# Patient Record
Sex: FEMALE | NOT HISPANIC OR LATINO
[De-identification: names, ages, dates, MRNs, and addresses within clinical notes are randomized per-mention and may not be internally consistent; named-entity substitution may affect disease eponyms.]

---

## 2023-03-27 ENCOUNTER — APPOINTMENT (OUTPATIENT)
Dept: ORTHOPEDIC SURGERY | Facility: CLINIC | Age: 43
End: 2023-03-27
Payer: OTHER MISCELLANEOUS

## 2023-03-27 DIAGNOSIS — M25.521 PAIN IN RIGHT ELBOW: ICD-10-CM

## 2023-03-27 DIAGNOSIS — G56.20 LESION OF ULNAR NERVE, UNSPECIFIED UPPER LIMB: ICD-10-CM

## 2023-03-27 DIAGNOSIS — M25.522 PAIN IN RIGHT ELBOW: ICD-10-CM

## 2023-03-27 DIAGNOSIS — M25.519 PAIN IN UNSPECIFIED SHOULDER: ICD-10-CM

## 2023-03-27 PROCEDURE — 73010 X-RAY EXAM OF SHOULDER BLADE: CPT | Mod: LT

## 2023-03-27 PROCEDURE — 95864 NEEDLE EMG 4 EXTREMITIES: CPT

## 2023-03-27 PROCEDURE — 73070 X-RAY EXAM OF ELBOW: CPT | Mod: 50

## 2023-03-27 PROCEDURE — 73030 X-RAY EXAM OF SHOULDER: CPT | Mod: LT

## 2023-03-27 PROCEDURE — 99203 OFFICE O/P NEW LOW 30 MIN: CPT

## 2023-03-28 ENCOUNTER — FORM ENCOUNTER (OUTPATIENT)
Age: 43
End: 2023-03-28

## 2023-03-28 ENCOUNTER — APPOINTMENT (OUTPATIENT)
Dept: PAIN MANAGEMENT | Facility: CLINIC | Age: 43
End: 2023-03-28
Payer: OTHER MISCELLANEOUS

## 2023-03-28 VITALS — WEIGHT: 177 LBS | BODY MASS INDEX: 31.36 KG/M2 | HEIGHT: 63 IN

## 2023-03-28 DIAGNOSIS — S23.9XXA SPRAIN OF UNSPECIFIED PARTS OF THORAX, INITIAL ENCOUNTER: ICD-10-CM

## 2023-03-28 DIAGNOSIS — Z86.39 PERSONAL HISTORY OF OTHER ENDOCRINE, NUTRITIONAL AND METABOLIC DISEASE: ICD-10-CM

## 2023-03-28 DIAGNOSIS — E10.641 TYPE 1 DIABETES MELLITUS WITH HYPOGLYCEMIA WITH COMA: ICD-10-CM

## 2023-03-28 PROCEDURE — 99214 OFFICE O/P EST MOD 30 MIN: CPT

## 2023-03-28 RX ORDER — METFORMIN HYDROCHLORIDE 500 MG/1
500 TABLET, COATED ORAL
Refills: 0 | Status: ACTIVE | COMMUNITY

## 2023-03-28 RX ORDER — ERTUGLIFLOZIN 15 MG/1
15 TABLET, FILM COATED ORAL
Refills: 0 | Status: ACTIVE | COMMUNITY

## 2023-03-28 NOTE — PHYSICAL EXAM
[Orientated] : orientated [Able to Communicate] : able to communicate [Well Developed] : well developed [Well Nourished] : well nourished [Flexion] : flexion [Extension] : extension [] : positive Aragon maneuver facet loading
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
## 2023-03-28 NOTE — WORK
[Sprain/Strain] : sprain/strain [Was the competent medical cause of the injury] : was the competent medical cause of the injury [Are consistent with the injury] : are consistent with the injury [Consistent with my objective findings] : consistent with my objective findings [Total (100%)] : total (100%) [Does not reveal pre-existing condition(s) that may affect treatment/prognosis] : does not reveal pre-existing condition(s) that may affect treatment/prognosis [Cannot return to work because ________] : cannot return to work because [unfilled] [Patient] : patient [FreeTextEntry2] : prior back pain, not similar to this

## 2023-03-29 ENCOUNTER — APPOINTMENT (OUTPATIENT)
Dept: NEUROLOGY | Facility: CLINIC | Age: 43
End: 2023-03-29
Payer: OTHER MISCELLANEOUS

## 2023-03-29 ENCOUNTER — APPOINTMENT (OUTPATIENT)
Dept: MRI IMAGING | Facility: CLINIC | Age: 43
End: 2023-03-29
Payer: OTHER MISCELLANEOUS

## 2023-03-29 DIAGNOSIS — R20.2 PARESTHESIA OF SKIN: ICD-10-CM

## 2023-03-29 DIAGNOSIS — M79.2 NEURALGIA AND NEURITIS, UNSPECIFIED: ICD-10-CM

## 2023-03-29 PROBLEM — G56.20 CUBITAL TUNNEL SYNDROME: Status: ACTIVE | Noted: 2023-03-29

## 2023-03-29 PROCEDURE — 73221 MRI JOINT UPR EXTREM W/O DYE: CPT | Mod: LT

## 2023-03-29 PROCEDURE — 95886 MUSC TEST DONE W/N TEST COMP: CPT

## 2023-03-29 PROCEDURE — 95911 NRV CNDJ TEST 9-10 STUDIES: CPT

## 2023-03-30 NOTE — DATA REVIEWED
[FreeTextEntry1] : 3 views of the bilateral elbows and 4 views of the left shoulder were obtained in the office today and independently evaluated without evidence of acute fracture.\par

## 2023-03-30 NOTE — IMAGING
[de-identified] : Bilateral upper extremities\par No ecchymosis, swelling or wounds\par Normal muscle tone/ bulk\par TTP at fovea\par No obvious ECU snapping or subluxation\par Shoulder (R / L)\par  / 170\par Abd 80 / 80\par ER at side 60 / 60\par IR T10\par Full painless elbow/wrist ROM\par Able to make a composite fist\par +AIN/ PIN/ UIlnar n\par SILT throughout\par fingers wwp\par - Ballotement\par - Piano key sign\par + ulnar grind\par - Tinel at elbow and carpal tunnel\par - Phalen's\par - Durkan's\par - Renuka's\par - Belly Press\par + Hawkin's \par - Mota's
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
## 2023-03-30 NOTE — DISCUSSION/SUMMARY
[Medication Risks Reviewed] : Medication risks reviewed [de-identified] : - NCS/ EMG b/l UE\par - MRI L wrist\par - NSAIDs prn pain\par - f/u after studies

## 2023-03-31 ENCOUNTER — APPOINTMENT (OUTPATIENT)
Dept: MRI IMAGING | Facility: CLINIC | Age: 43
End: 2023-03-31

## 2023-04-03 ENCOUNTER — APPOINTMENT (OUTPATIENT)
Dept: ORTHOPEDIC SURGERY | Facility: CLINIC | Age: 43
End: 2023-04-03
Payer: OTHER MISCELLANEOUS

## 2023-04-03 PROCEDURE — 99213 OFFICE O/P EST LOW 20 MIN: CPT

## 2023-04-03 PROCEDURE — L3908: CPT | Mod: RT

## 2023-04-11 ENCOUNTER — APPOINTMENT (OUTPATIENT)
Dept: PAIN MANAGEMENT | Facility: CLINIC | Age: 43
End: 2023-04-11
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
## 2023-04-11 NOTE — DISCUSSION/SUMMARY
[Medication Risks Reviewed] : Medication risks reviewed [de-identified] : - NSAIDs prn pain\par - follow-up pain management\par - brace\par - activity modifications\par - PT Rx\par - f/u in 4 weeks

## 2023-04-11 NOTE — DATA REVIEWED
[FreeTextEntry1] : MRI 3/30/2023: Ulnar TFCC tear, tenosynovitis/tendinopathy of EPL, EPB and APB\par \par EMG: Normal study, no peripheral neuropathy, or radiculopathy

## 2023-04-11 NOTE — IMAGING
[de-identified] : Bilateral upper extremities\par No ecchymosis, swelling or wounds\par Normal muscle tone/ bulk\par TTP at fovea\par No obvious ECU snapping or subluxation\par Shoulder (R / L)\par  / 170\par Abd 80 / 80\par ER at side 60 / 60\par IR T10\par Full painless elbow/wrist ROM\par Able to make a composite fist\par +AIN/ PIN/ UIlnar n\par SILT throughout\par fingers wwp\par - Ballotement\par - Piano key sign\par + ulnar grind\par - Tinel at elbow and carpal tunnel\par - Phalen's\par - Durkan's\par - Renuka's\par - Belly Press\par + Hawkin's \par - Mota's

## 2023-04-14 ENCOUNTER — APPOINTMENT (OUTPATIENT)
Dept: MRI IMAGING | Facility: CLINIC | Age: 43
End: 2023-04-14

## 2023-04-19 ENCOUNTER — FORM ENCOUNTER (OUTPATIENT)
Age: 43
End: 2023-04-19

## 2023-05-01 ENCOUNTER — APPOINTMENT (OUTPATIENT)
Dept: ORTHOPEDIC SURGERY | Facility: CLINIC | Age: 43
End: 2023-05-01

## 2023-05-02 ENCOUNTER — APPOINTMENT (OUTPATIENT)
Dept: PAIN MANAGEMENT | Facility: CLINIC | Age: 43
End: 2023-05-02
Payer: OTHER MISCELLANEOUS

## 2023-05-02 VITALS — BODY MASS INDEX: 31.71 KG/M2 | WEIGHT: 179 LBS | HEIGHT: 63 IN

## 2023-05-02 PROCEDURE — 99214 OFFICE O/P EST MOD 30 MIN: CPT

## 2023-05-02 NOTE — DISCUSSION/SUMMARY
[Surgical risks reviewed] : Surgical risks reviewed [de-identified] : will trial gabapentin\par \par consider TFESI BL - hesitant to pursue\par \par will issue PT RX as well \par \par After discussing various treatment options with the patient including but not limited to oral medications, physical therapy, exercise,\par modalities as well as interventional spinal injections, we have decided with the following plan\par \par I personally reviewed the MRI/CT scan images and agree with the radiologist's report. The\par radiological findings were discussed with the patient.\par \par \par The risks, benefits, contents and alternatives to injection were explained in full to the patient. Risks outlined include but are not\par limited to infection,sepsis, bleeding, post-dural puncture headache, nerve damage, temporary increase in pain, syncopal episode,\par failure to resolve symptoms, allergic reaction, symptom recurrence, and elevation of blood sugar in diabetics. Cortisone may cause\par immunosuppression. Patient understands the risks. All questions were answered. After discussion of options, patient requested\par an injection. Information regarding the injection was given to the patient. Which medications to stop prior to the injection was\par explained to the patient as well.\par \par Follow up in 1-2 weeks post injection for re-evaluation.\par \par  Conservative Care\par Continue Home exercises, stretching, activity modification, physical therapy, and conservative care.\par

## 2023-05-02 NOTE — HISTORY OF PRESENT ILLNESS
[Upper back] : upper back [Mid-back] : mid-back [Lower back] : lower back [Buttock] : buttock [Left Leg] : left leg [Burning] : burning [Sharp] : sharp [] : yes [Frequent] : frequent [Leisure] : leisure [Sleep] : sleep [Social interactions] : social interactions [Sitting] : sitting [Walking] : walking [Lying in bed] : lying in bed

## 2023-05-02 NOTE — ASSESSMENT
[FreeTextEntry1] : pain is in lower back and into bl leg with paresthesias in toes bl , R > L \par \par

## 2023-05-02 NOTE — PHYSICAL EXAM
[Normal Mood and Affect] : normal mood and affect [Orientated] : orientated [Able to Communicate] : able to communicate [Well Developed] : well developed [Well Nourished] : well nourished [] : motor exam is 5/5 throughout both lower extremities with normal tone

## 2023-06-14 ENCOUNTER — APPOINTMENT (OUTPATIENT)
Dept: PAIN MANAGEMENT | Facility: CLINIC | Age: 43
End: 2023-06-14
Payer: OTHER MISCELLANEOUS

## 2023-06-14 VITALS — BODY MASS INDEX: 31.71 KG/M2 | WEIGHT: 179 LBS | HEIGHT: 63 IN

## 2023-06-14 PROCEDURE — 99214 OFFICE O/P EST MOD 30 MIN: CPT

## 2023-06-14 RX ORDER — GABAPENTIN 300 MG/1
300 CAPSULE ORAL TWICE DAILY
Qty: 60 | Refills: 0 | Status: ACTIVE | COMMUNITY
Start: 2023-05-02 | End: 1900-01-01

## 2023-06-14 NOTE — DISCUSSION/SUMMARY
[de-identified] : \par After discussing various treatment options with the patient including but not limited to oral medications, physical therapy, exercise, modalities as well as interventional spinal injections, we have decided with the following plan: \par \par - pharmacological: c/w gabapentin 300mg BID\par - Imaging: I personally reviewed the radiological images and agree with the radiologist's report. The radiological findings were discussed with the patient.\par - Interventional: hesitant to schedule b/l L5-S1 TFESI, will consider in the future. Procedure explained using an anatomical model. Risks and benefits explained to patient who verbalized understanding, including increased risk of infection, bleeding, nerve injury. \par - rehabilitative: c/w HEP/PT \par - follow up 1 month\par \par Jerad Moreno MD\par \par

## 2023-06-14 NOTE — PHYSICAL EXAM
[de-identified] : Gen: NAD\par Gait: antalgic\par Psych:  Mood appropriate for given condition\par ROM: limited AROM of the L spine in all planes, full ROM at ankles, knees and hips  \par Sensation: decreased to lt touch over L/R leg, otherwise intact to light touch in all dermatomes tested from L2-S1 bilaterally\par Reflexes: 2+ b/l patella and Achilles\par Palpation: Diffusely tender over lumbar paraspinals  -TTP over the b/l greater trochanters and bilateral SI joint\par Provacative tests: +SLR, and seated root (slump test) on the L/R, neg Aragon, ANIBAL testing, FADIR testing\par \par 				Right	Left\par L2/3	Hip flexion		 5	 5\par L3/4	Knee Extension		 5	 5\par L4/5	Ankle Dorsiflexion 	 5	 5\par L5/S1	Great toe extension	 4	 4\par L4/5	Inversion		 5	 5\par L5/S1	Eversion		 5	 5\par \par \par

## 2023-06-14 NOTE — HISTORY OF PRESENT ILLNESS
[Lower back] : lower back [Left Leg] : left leg [Right Leg] : right leg [Work related] : work related [Radiating] : radiating [Sharp] : sharp [Shooting] : shooting [Stabbing] : stabbing [Tightness] : tightness [Tingling] : tingling [Constant] : constant [Household chores] : household chores [Leisure] : leisure [Work] : work [Social interactions] : social interactions [Nothing helps with pain getting better] : Nothing helps with pain getting better [Sitting] : sitting [Standing] : standing [Walking] : walking [Full time] : Work status: full time [] : no [FreeTextEntry1] : B/L knees [FreeTextEntry3] : 3/15/23 [FreeTextEntry6] : stiffness , numbness

## 2023-06-16 ENCOUNTER — APPOINTMENT (OUTPATIENT)
Dept: ORTHOPEDIC SURGERY | Facility: CLINIC | Age: 43
End: 2023-06-16
Payer: OTHER MISCELLANEOUS

## 2023-06-16 PROCEDURE — 99213 OFFICE O/P EST LOW 20 MIN: CPT

## 2023-06-16 NOTE — WORK
[Sprain/Strain] : sprain/strain [Was the competent medical cause of the injury] : was the competent medical cause of the injury [Are consistent with the injury] : are consistent with the injury [Consistent with my objective findings] : consistent with my objective findings [Partial] : partial [Does not reveal pre-existing condition(s) that may affect treatment/prognosis] : does not reveal pre-existing condition(s) that may affect treatment/prognosis [Can return to work without limitations on ______] : can return to work without limitations on [unfilled] [Patient] : patient [No Rx restrictions] : No Rx restrictions. [I provided the services listed above] :  I provided the services listed above.

## 2023-06-16 NOTE — DATA REVIEWED
[No studies available for review at this time.] : No studies available for review at this time. [FreeTextEntry1] : MRI 3/30/2023: Ulnar TFCC tear, tenosynovitis/tendinopathy of EPL, EPB and APB\par \par EMG 3/29/23: Normal study, no peripheral neuropathy, or radiculopathy

## 2023-06-16 NOTE — DISCUSSION/SUMMARY
[Medication Risks Reviewed] : Medication risks reviewed [de-identified] : - NSAIDs prn pain\par - follow-up pain management\par - continue braces\par - activity modifications\par - PT Rx\par - new OT Rx provided today for her ulnar-sided wrist pain\par - f/u in 2 months

## 2023-06-16 NOTE — IMAGING
[de-identified] : Bilateral upper extremities\par No ecchymosis, swelling or wounds\par Normal muscle tone/ bulk\par NTTP throughout dorsal wrists\par Mildly TTP at fovea, L>>R\par No obvious ECU snapping or subluxation\par \par Shoulder (R / L)\par  / 170\par Abd 80 / 80\par ER at side 60 / 60\par IR T10\par Full painless elbow/wrist ROM\par Mild TTP over left lateral epicondyle \par pain at lateral epicondyle with resisted L wrist extension\par Able to make a composite fist\par +AIN/ PIN/ UIlnar n\par SILT throughout\par fingers wwp\par + ulnar grind\par - Tinel at elbow and carpal tunnel\par - Phalen's\par - Durkan's
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
## 2023-07-11 ENCOUNTER — FORM ENCOUNTER (OUTPATIENT)
Age: 43
End: 2023-07-11

## 2023-07-11 NOTE — DISCUSSION/SUMMARY
[de-identified] : \par After discussing various treatment options with the patient including but not limited to oral medications, physical therapy, exercise, modalities as well as interventional spinal injections, we have decided with the following plan: \par \par - pharmacological: c/w gabapentin 300mg BID\par - Imaging: I personally reviewed the radiological images and agree with the radiologist's report. The radiological findings were discussed with the patient.\par - Interventional: hesitant to schedule b/l L5-S1 TFESI, will consider in the future. Procedure explained using an anatomical model. Risks and benefits explained to patient who verbalized understanding, including increased risk of infection, bleeding, nerve injury. \par - rehabilitative: c/w HEP/PT \par - follow up 1 month\par \par Jerad Moreno MD\par \par

## 2023-07-11 NOTE — HISTORY OF PRESENT ILLNESS
[Lower back] : lower back [Left Leg] : left leg [Right Leg] : right leg [Work related] : work related [Radiating] : radiating [Sharp] : sharp [Shooting] : shooting [Stabbing] : stabbing [Tightness] : tightness [Tingling] : tingling [Constant] : constant [Household chores] : household chores [Leisure] : leisure [Work] : work [Social interactions] : social interactions [Nothing helps with pain getting better] : Nothing helps with pain getting better [Sitting] : sitting [Standing] : standing [Walking] : walking [Full time] : Work status: full time [] : yes [FreeTextEntry1] : B/L knees [FreeTextEntry3] : 3/15/23 [FreeTextEntry6] : stiffness , numbness

## 2023-07-12 ENCOUNTER — APPOINTMENT (OUTPATIENT)
Dept: PAIN MANAGEMENT | Facility: CLINIC | Age: 43
End: 2023-07-12

## 2023-07-13 ENCOUNTER — FORM ENCOUNTER (OUTPATIENT)
Age: 43
End: 2023-07-13

## 2023-07-21 ENCOUNTER — FORM ENCOUNTER (OUTPATIENT)
Age: 43
End: 2023-07-21

## 2023-08-04 ENCOUNTER — APPOINTMENT (OUTPATIENT)
Dept: ORTHOPEDIC SURGERY | Facility: CLINIC | Age: 43
End: 2023-08-04

## 2023-11-27 ENCOUNTER — APPOINTMENT (OUTPATIENT)
Dept: ORTHOPEDIC SURGERY | Facility: CLINIC | Age: 43
End: 2023-11-27
Payer: OTHER MISCELLANEOUS

## 2023-11-27 PROCEDURE — 99214 OFFICE O/P EST MOD 30 MIN: CPT

## 2024-01-29 ENCOUNTER — APPOINTMENT (OUTPATIENT)
Dept: ORTHOPEDIC SURGERY | Facility: CLINIC | Age: 44
End: 2024-01-29
Payer: OTHER MISCELLANEOUS

## 2024-01-29 PROCEDURE — 99213 OFFICE O/P EST LOW 20 MIN: CPT

## 2024-01-30 NOTE — DISCUSSION/SUMMARY
[Medication Risks Reviewed] : Medication risks reviewed [de-identified] : - NSAIDs prn pain - follow-up pain management - continue braces - activity modifications - new OT Rx provided today - f/u in 2 months
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
## 2024-01-30 NOTE — IMAGING
[de-identified] : Shoulder (R / L)  / 170 Abd 80 / 80 ER at side 60 / 60 IR T10/ T10  Bilateral upper extremities No ecchymosis, swelling or wounds Normal muscle tone/ bulk NTTP throughout dorsal wrists Mildly TTP at fovea, L>>R No obvious ECU snapping or subluxation Moderately TTP along first dorsal compartment on the left Full symmetric elbow/wrist ROM Able to make a composite fist +AIN/ PIN/ Ulnar n SILT throughout fingers wwp + ulnar grind + Fovea sign  + Finkelstein's - Tinel at elbow and carpal tunnel - Phalen's - Durkan's

## 2024-02-22 ENCOUNTER — APPOINTMENT (OUTPATIENT)
Dept: PAIN MANAGEMENT | Facility: CLINIC | Age: 44
End: 2024-02-22
Payer: OTHER MISCELLANEOUS

## 2024-02-22 VITALS — BODY MASS INDEX: 31.71 KG/M2 | WEIGHT: 179 LBS | HEIGHT: 63 IN

## 2024-02-22 DIAGNOSIS — M54.17 RADICULOPATHY, LUMBOSACRAL REGION: ICD-10-CM

## 2024-02-22 DIAGNOSIS — M79.18 MYALGIA, OTHER SITE: ICD-10-CM

## 2024-02-22 DIAGNOSIS — M51.26 OTHER INTERVERTEBRAL DISC DISPLACEMENT, LUMBAR REGION: ICD-10-CM

## 2024-02-22 PROCEDURE — 99213 OFFICE O/P EST LOW 20 MIN: CPT

## 2024-02-22 NOTE — DISCUSSION/SUMMARY
[de-identified] : CELINE RAHMAN is a 43 year-old woman presenting for a NPV for a history of chronic low back pain with radicular features.  Prior treatment: Gabapentin Physical therapy x6 weeks Naproxen OTC prn  Plan: 1) MRI lumbar spine images reviewed with the patient. 2) Recommend that the patient restart physical therapy, as this has been helpful for her pain and function in the past and she notes that the pain has been worse since she stopped physical therapy in 12/2023. New script provided. 3) Continue naproxen OTC prn 4) RTC prn  Patient has returned to full time work without limitations.

## 2024-02-22 NOTE — HISTORY OF PRESENT ILLNESS
[FreeTextEntry1] : WC - DOI 3/15/2023  2/22/2024: CELINE RAHMAN is a 43 year-old woman presenting for a NPV (Dr. Moreno) for a history of chronic low back pain with radicular features. The patient states that she had a fall down the stairs at work on 3/15/2023. At this point the patient endorses having an aching pain across the low lumbar region with radiation to the bilateral posterolateral thighs and anterior legs. No focal numbness or weakness in the lower extremities. No bowel or bladder incontinence or saddle anesthesia. Pain is worse with sitting or standing for long periods.   The patient states that average pain over the past week was 8/10 in severity. Mood: No depression or anxiety.  Sleep: Patient notes some difficulty with sleep initiation and maintenance 2/2 pain.   Dr. Moreno: Initial HPI 06/14/2023 Ms. RAHMAN is a 42 year old F who presents for initial evaluation for low back and leg pain. Pain started >8 weeks ago and pain rated 7/10 or higher. It is associated with an injury at work 3/15/23. Pain radiates to down the L/R leg. Pain is described as shooting and electric shock when radiating. Pain is worsened with sitting, coughing and bearing down. Patient has failed conservative treatment with acetaminophen, NSAIDs, muscle relaxants, and physical therapy/HEP. Pain is causing significant functional limitation resulting in diminished quality of life and impaired age appropriate ADL's. Patient denies loss of bowel/bladder function, new motor deficits, fevers, or chills. There is associated numbness/tingling.  [Lower back] : lower back [Sudden] : sudden [Work related] : work related [7] : 7 [Burning] : burning [Sharp] : sharp [Constant] : constant [Shooting] : shooting [Household chores] : household chores [Leisure] : leisure [Rest] : rest [Standing] : standing [Walking] : walking [] : no [Full time] : Work status: full time [FreeTextEntry3] : 03/15/2023

## 2024-02-22 NOTE — DATA REVIEWED
[MRI] : MRI [Lumbar Spine] : lumbar spine [Report was reviewed and noted in the chart] : The report was reviewed and noted in the chart [I independently reviewed and interpreted images and report] : I independently reviewed and interpreted images and report [FreeTextEntry1] : 4/23/2023 ([facility]) FINDINGS:   RETROPERITONEUM AND SOFT TISSUES:  Retroperitoneal structures demonstrate normal appearance.  Paraspinal musculature demonstrates symmetric size and signal.  No superficial soft tissue abnormalities identified.  OSSEOUS STRUCTURES:  Vertebral bodies demonstrate normal marrow signal.  Straightening of the normal lumbar lordosis. No spondylolisthesis.  No evidence for aggressive osseous lesions appreciated. No evidence for fracture.  Conus medullaris noted posterior to T12-L1.  L1-L2 level demonstrates no disc bulge or herniation. No foraminal impingement or canal stenosis. L2-L3 level demonstrates no disc bulge or herniation. No foraminal impingement or canal stenosis. L3-L4 level demonstrates no disc bulge or herniation. No foraminal impingement or canal stenosis. L4-L5 level demonstrates a disc bulge with posterior right paracentral focal herniation and annular fissure causing impingement upon the anterior thecal sac and bilateral neural foramina. Contact with the anterior right-sided L5 nerve root. L5-S1 level demonstrates right paracentral focal herniation with annular fissure causing impingement upon the epidural fat and bilateral neural foramina. Contact with the anterior bilateral S1 nerve roots.  IMPRESSION:   1.  L4-L5 level demonstrates a disc bulge with posterior right paracentral focal herniation and annular fissure causing impingement upon the anterior thecal sac and bilateral neural foramina. Contact with the anterior right-sided L5 nerve root. 2.  L5-S1 level demonstrates right paracentral focal herniation with annular fissure causing impingement upon the epidural fat and bilateral neural foramina. Contact with the anterior bilateral S1 nerve roots. 3.  Straightening of the normal lumbar lordosis indicative of muscular spasm.

## 2024-04-30 ENCOUNTER — APPOINTMENT (OUTPATIENT)
Dept: ORTHOPEDIC SURGERY | Facility: CLINIC | Age: 44
End: 2024-04-30
Payer: OTHER MISCELLANEOUS

## 2024-04-30 DIAGNOSIS — M77.12 LATERAL EPICONDYLITIS, LEFT ELBOW: ICD-10-CM

## 2024-04-30 DIAGNOSIS — E07.9 DISORDER OF THYROID, UNSPECIFIED: ICD-10-CM

## 2024-04-30 DIAGNOSIS — M77.11 LATERAL EPICONDYLITIS, RIGHT ELBOW: ICD-10-CM

## 2024-04-30 DIAGNOSIS — S46.019A STRAIN OF MUSCLE(S) AND TENDON(S) OF THE ROTATOR CUFF OF UNSPECIFIED SHOULDER, INITIAL ENCOUNTER: ICD-10-CM

## 2024-04-30 DIAGNOSIS — M65.4 RADIAL STYLOID TENOSYNOVITIS [DE QUERVAIN]: ICD-10-CM

## 2024-04-30 DIAGNOSIS — S63.599A OTHER SPECIFIED SPRAIN OF UNSPECIFIED WRIST, INITIAL ENCOUNTER: ICD-10-CM

## 2024-04-30 PROCEDURE — 99213 OFFICE O/P EST LOW 20 MIN: CPT

## 2024-05-10 PROBLEM — E07.9 THYROID DISORDER: Status: ACTIVE | Noted: 2024-04-30

## 2024-06-24 ENCOUNTER — APPOINTMENT (OUTPATIENT)
Dept: ORTHOPEDIC SURGERY | Facility: CLINIC | Age: 44
End: 2024-06-24

## 2024-08-13 NOTE — HISTORY OF PRESENT ILLNESS
[Upper back] : upper back [Mid-back] : mid-back [Lower back] : lower back [Buttock] : buttock [Left Leg] : left leg [Burning] : burning [Sharp] : sharp show [] : yes [Frequent] : frequent [Leisure] : leisure [Sleep] : sleep [Social interactions] : social interactions [Sitting] : sitting [Walking] : walking [Lying in bed] : lying in bed

## 2024-11-25 ENCOUNTER — APPOINTMENT (OUTPATIENT)
Dept: ORTHOPEDIC SURGERY | Facility: CLINIC | Age: 44
End: 2024-11-25
Payer: OTHER MISCELLANEOUS

## 2024-11-25 DIAGNOSIS — M65.4 RADIAL STYLOID TENOSYNOVITIS [DE QUERVAIN]: ICD-10-CM

## 2024-11-25 DIAGNOSIS — M77.12 LATERAL EPICONDYLITIS, LEFT ELBOW: ICD-10-CM

## 2024-11-25 DIAGNOSIS — M77.11 LATERAL EPICONDYLITIS, RIGHT ELBOW: ICD-10-CM

## 2024-11-25 DIAGNOSIS — S63.599A OTHER SPECIFIED SPRAIN OF UNSPECIFIED WRIST, INITIAL ENCOUNTER: ICD-10-CM

## 2024-11-25 DIAGNOSIS — G56.20 LESION OF ULNAR NERVE, UNSPECIFIED UPPER LIMB: ICD-10-CM

## 2024-11-25 DIAGNOSIS — M25.519 PAIN IN UNSPECIFIED SHOULDER: ICD-10-CM

## 2024-11-25 PROCEDURE — 99213 OFFICE O/P EST LOW 20 MIN: CPT

## 2024-12-01 ENCOUNTER — EMERGENCY (EMERGENCY)
Facility: HOSPITAL | Age: 44
LOS: 1 days | Discharge: HOME | End: 2024-12-01
Admitting: STUDENT IN AN ORGANIZED HEALTH CARE EDUCATION/TRAINING PROGRAM
Payer: COMMERCIAL

## 2024-12-01 VITALS
WEIGHT: 179.9 LBS | HEIGHT: 63 IN | SYSTOLIC BLOOD PRESSURE: 128 MMHG | TEMPERATURE: 98 F | HEART RATE: 100 BPM | OXYGEN SATURATION: 99 % | RESPIRATION RATE: 16 BRPM | DIASTOLIC BLOOD PRESSURE: 83 MMHG

## 2024-12-01 LAB
APPEARANCE UR: CLEAR — SIGNIFICANT CHANGE UP
BILIRUB UR-MCNC: NEGATIVE — SIGNIFICANT CHANGE UP
COLOR SPEC: YELLOW — SIGNIFICANT CHANGE UP
DIFF PNL FLD: NEGATIVE — SIGNIFICANT CHANGE UP
GLUCOSE UR QL: 500 MG/DL
KETONES UR-MCNC: NEGATIVE MG/DL — SIGNIFICANT CHANGE UP
LEUKOCYTE ESTERASE UR-ACNC: NEGATIVE — SIGNIFICANT CHANGE UP
NITRITE UR-MCNC: NEGATIVE — SIGNIFICANT CHANGE UP
PH UR: 6 — SIGNIFICANT CHANGE UP (ref 5–8)
PROT UR-MCNC: NEGATIVE MG/DL — SIGNIFICANT CHANGE UP
SP GR SPEC: 1.01 — SIGNIFICANT CHANGE UP (ref 1–1.03)
UROBILINOGEN FLD QL: 0.2 MG/DL — SIGNIFICANT CHANGE UP (ref 0.2–1)

## 2024-12-01 PROCEDURE — 99284 EMERGENCY DEPT VISIT MOD MDM: CPT

## 2024-12-01 NOTE — ED PROVIDER NOTE - CLINICAL SUMMARY MEDICAL DECISION MAKING FREE TEXT BOX
- 44-year-old female past medical history DM2 hypothyroidism presenting to ED complaining of 1 episode of brown vaginal discharge.  Patient states this morning after urinating went to go wipe and saw consistency that she believed to be feces.  Patient states about 3 times with brown discharge each time.  Has not had any incidents since.  Patient denies any abdominal pain, pelvic pain, urinary symptoms, fever/chills.  Patient states called OB/GYN office who is out due to pregnancy, called her PMD who told her to come to the ER.  Patient denies any nausea/vomiting/diarrhea.  - No adnexal tenderness or cervical motion tenderness on bimanual exam, no blood or discharge on speculum exam, normal vaginal secretions. Will obtain US to assess for hematuria/UTI. TVUS not indicated at this time given benign exam with no tenderness/discharge/blood. No suspicion for ovarian torsion given no pain, no vomiting. No suspicion for recto-vaginal fistula given benign speculum exam. Patient denies concern for STD states only [id number] active with , will send GC/Chlamydia. Likely DC with outpatient GYN pending UA result.

## 2024-12-01 NOTE — ED ADULT TRIAGE NOTE - CHIEF COMPLAINT QUOTE
states  small amt brown bm from vagina this am,    states lower abd pains earlier. denies at present. lmp 11/12- normal

## 2024-12-01 NOTE — ED PROVIDER NOTE - PROGRESS NOTE DETAILS
HOLA Prieto NP: UA negative, will discharge patient with outpatient GYN follow up. Patient updated on results and is agreeable to plan. Questions answered, patient verbalizes understanding. Return precautions given.

## 2024-12-01 NOTE — ED ADULT NURSE NOTE - OBJECTIVE STATEMENT
pt. received to wellness with c/o vaginal discharge. pt. endorses noticing pt. received to wellness with c/o vaginal discharge. pt. endorses noticing brown vaginal discharge this morning. Denies vaginal bleeding, abdominal pain,  symptoms. NAD noted. urine collected and sent. comfort measures provided. safety precautions maintained.

## 2024-12-01 NOTE — ED PROVIDER NOTE - PHYSICAL EXAMINATION
CONSTITUTIONAL: Appears well, in no apparent distress  HEAD: Normocephalic, no obvious signs of trauma  EENT: PERRL, nares patent no drainage, no pharyngeal erythema, swelling, or exudates  NECK: Trachea midline, no goiter  RESP: no accessory muscle use, speaking full sentences  CARDIC: RRR, no peripheral edema  GI: ABD soft, nondistended, nontender on palpation, no palpable masses  : No CVA Tenderness  GYN: No adnexal/cervical motion tenderness on bimanual, NO blood or discharge on speculum exam.   MSK: Moves all extremities   SKIN: No rashes, normal color/condition   NEURO: A&OX4,No focal motor deficits/weakness, no sensory deficits, no slurred speech, no facial droop, normal gait

## 2024-12-01 NOTE — ED PROVIDER NOTE - PATIENT PORTAL LINK FT
You can access the FollowMyHealth Patient Portal offered by [facility] by registering at the following website: [email or web address]. By joining [facility] FollowMyHealth portal, you will also be able to view your health information using other applications (apps) compatible with our system.

## 2024-12-01 NOTE — ED PROVIDER NOTE - NSFOLLOWUPINSTRUCTIONS_ED_ALL_ED_FT
- You were seen in the Emergency Department Today for vaginal discharge.   - Your pelvic exam was negative for any abnormalities.   - Your urine results were negative.   - Please follow up with your GYN   - Please follow up with your primary care doctor as discussed  - Return to the Emergency Department IMMEDIATELY if you experience Abdominal pain, pelvic pain, vaginal bleeding or discharge, urinary symptoms, fever/chills, lightheadedness/dizziness, you pass out.        VAGINAL DISCHARGE - General Information    Vaginal Discharge    WHAT YOU NEED TO KNOW:    What do I need to know about vaginal discharge? Vaginal discharge is normal. It is usually clear or white and odorless. Vaginal discharge is your body's way of cleaning your vagina so it is healthy. Irritation, itching, burning, or a change in the amount, smell, or color may indicate a problem.    What causes changes in vaginal discharge?    Chemicals in douches    Feminine hygiene sprays    Certain harsh soaps    Antibiotics    Diabetes, pregnancy, or an infection  How can I help keep my vagina healthy?    Always wipe from front to back after you use the toilet. This prevents spreading bacteria from your rectal area into your vagina.    Clean in and around your vagina with mild soap and warm water each day. Gently dry the area after washing. Do not use hot tubs. The heat and moisture from hot tubs can increase your risk for another yeast infection.    Do not wear tight-fitting clothes or undergarments for long periods. Wear cotton underwear during the day. Cotton helps keep your genital area dry and does not hold in warmth or moisture. Do not wear underwear at night.    Change your laundry soap or fabric softener if you think it is irritating your skin.    Do not douche or use feminine hygiene sprays or bubble bath. Do not use pads or tampons that are scented, or colored or perfumed toilet paper.    Ask your healthcare provider about birth control options if necessary. Condoms have latex and diaphragms have gel that kill sperm. Both of these may irritate your genital area.  When should I contact my healthcare provider?    You have swelling, burning, itching, or irritation in or around your vagina.    You have an increase in the amount of discharge.    The color or smell of your discharge changes.    Your discharge looks similar to cottage cheese.    Your discharge is bloody and it is not your monthly period.    You have pain during sexual intercourse.    You have trouble urinating, or you urinate often and with urgency.    You have abdominal pain or cramps.    You have a fever or chills.    You have low back pain or side pain.    You have questions or concerns about your condition or care.

## 2024-12-02 PROBLEM — M25.519 SHOULDER PAIN, ACUTE: Status: RESOLVED | Noted: 2023-03-27 | Resolved: 2024-12-02

## 2024-12-02 PROBLEM — G56.20 CUBITAL TUNNEL SYNDROME: Status: RESOLVED | Noted: 2023-03-29 | Resolved: 2024-12-02

## 2024-12-02 LAB
C TRACH RRNA SPEC QL NAA+PROBE: SIGNIFICANT CHANGE UP
N GONORRHOEA RRNA SPEC QL NAA+PROBE: SIGNIFICANT CHANGE UP
SPECIMEN SOURCE: SIGNIFICANT CHANGE UP